# Patient Record
Sex: FEMALE | Race: WHITE | NOT HISPANIC OR LATINO | URBAN - METROPOLITAN AREA
[De-identification: names, ages, dates, MRNs, and addresses within clinical notes are randomized per-mention and may not be internally consistent; named-entity substitution may affect disease eponyms.]

---

## 2017-11-30 ENCOUNTER — OUTPATIENT (OUTPATIENT)
Dept: OUTPATIENT SERVICES | Facility: HOSPITAL | Age: 82
LOS: 1 days | Discharge: HOME | End: 2017-11-30

## 2017-12-01 DIAGNOSIS — Z78.0 ASYMPTOMATIC MENOPAUSAL STATE: ICD-10-CM

## 2017-12-01 DIAGNOSIS — M81.0 AGE-RELATED OSTEOPOROSIS WITHOUT CURRENT PATHOLOGICAL FRACTURE: ICD-10-CM

## 2017-12-01 DIAGNOSIS — Z87.310 PERSONAL HISTORY OF (HEALED) OSTEOPOROSIS FRACTURE: ICD-10-CM

## 2017-12-01 DIAGNOSIS — Z13.820 ENCOUNTER FOR SCREENING FOR OSTEOPOROSIS: ICD-10-CM

## 2020-04-27 PROBLEM — Z00.00 ENCOUNTER FOR PREVENTIVE HEALTH EXAMINATION: Status: ACTIVE | Noted: 2020-04-27

## 2020-11-13 ENCOUNTER — APPOINTMENT (OUTPATIENT)
Dept: CARDIOLOGY | Facility: CLINIC | Age: 85
End: 2020-11-13

## 2021-06-18 ENCOUNTER — APPOINTMENT (OUTPATIENT)
Dept: CARDIOLOGY | Facility: CLINIC | Age: 86
End: 2021-06-18
Payer: MEDICARE

## 2021-06-18 VITALS
WEIGHT: 114 LBS | OXYGEN SATURATION: 98 % | TEMPERATURE: 98 F | RESPIRATION RATE: 16 BRPM | HEIGHT: 60 IN | SYSTOLIC BLOOD PRESSURE: 133 MMHG | BODY MASS INDEX: 22.38 KG/M2 | HEART RATE: 67 BPM | DIASTOLIC BLOOD PRESSURE: 79 MMHG

## 2021-06-18 PROCEDURE — 93000 ELECTROCARDIOGRAM COMPLETE: CPT

## 2021-06-18 PROCEDURE — 99072 ADDL SUPL MATRL&STAF TM PHE: CPT

## 2021-06-18 PROCEDURE — 99204 OFFICE O/P NEW MOD 45 MIN: CPT

## 2021-06-18 RX ORDER — CALCIUM CITRATE/VITAMIN D3 315MG-6.25
TABLET ORAL DAILY
Refills: 0 | Status: ACTIVE | COMMUNITY

## 2021-06-18 NOTE — ASSESSMENT
[FreeTextEntry1] : PAF - Patient has symptomatic paroxysmal atrial fibrillation. CGADVASC 4\par - recommend AC ; on the day and discussed the need for AC. All question answered.\par - event monitor to evaluate burden

## 2021-06-18 NOTE — PHYSICAL EXAM
[Well Developed] : well developed [Well Nourished] : well nourished [No Acute Distress] : no acute distress [Normal Conjunctiva] : normal conjunctiva [Normal Venous Pressure] : normal venous pressure [No Carotid Bruit] : no carotid bruit [No Murmur] : no murmur [Normal S1, S2] : normal S1, S2 [No Rub] : no rub [No Gallop] : no gallop [Clear Lung Fields] : clear lung fields [Good Air Entry] : good air entry [No Respiratory Distress] : no respiratory distress  [Soft] : abdomen soft [Non Tender] : non-tender [No Masses/organomegaly] : no masses/organomegaly [Normal Gait] : normal gait [Normal Bowel Sounds] : normal bowel sounds [No Edema] : no edema [No Cyanosis] : no cyanosis [No Clubbing] : no clubbing [No Varicosities] : no varicosities [No Rash] : no rash [No Skin Lesions] : no skin lesions [Moves all extremities] : moves all extremities [No Focal Deficits] : no focal deficits [Normal Speech] : normal speech [Alert and Oriented] : alert and oriented [Normal memory] : normal memory

## 2021-06-18 NOTE — HISTORY OF PRESENT ILLNESS
[FreeTextEntry1] : Patient with history of HLD, HTN, two episodes of AF after hip surgery CHADVSC 4 (weitgh 52 kg). Patient stated that she has occasional palpitations but not bothering her too much. Patient comes to the office for further evaluation and treatment over atrial fibrillation.\par \par \par EKG SR 60 bpm

## 2021-08-20 ENCOUNTER — APPOINTMENT (OUTPATIENT)
Dept: CARDIOLOGY | Facility: CLINIC | Age: 86
End: 2021-08-20
Payer: MEDICARE

## 2021-08-20 VITALS
HEART RATE: 57 BPM | HEIGHT: 60 IN | BODY MASS INDEX: 22.38 KG/M2 | DIASTOLIC BLOOD PRESSURE: 69 MMHG | TEMPERATURE: 97 F | SYSTOLIC BLOOD PRESSURE: 121 MMHG | WEIGHT: 114 LBS

## 2021-08-20 DIAGNOSIS — I48.91 UNSPECIFIED ATRIAL FIBRILLATION: ICD-10-CM

## 2021-08-20 DIAGNOSIS — I10 ESSENTIAL (PRIMARY) HYPERTENSION: ICD-10-CM

## 2021-08-20 DIAGNOSIS — Z78.9 OTHER SPECIFIED HEALTH STATUS: ICD-10-CM

## 2021-08-20 PROCEDURE — 99213 OFFICE O/P EST LOW 20 MIN: CPT

## 2021-08-20 PROCEDURE — 93000 ELECTROCARDIOGRAM COMPLETE: CPT

## 2021-08-20 RX ORDER — MELATONIN 3 MG
TABLET ORAL DAILY
Refills: 0 | Status: ACTIVE | COMMUNITY

## 2021-08-20 RX ORDER — ATORVASTATIN CALCIUM 10 MG/1
10 TABLET, FILM COATED ORAL DAILY
Refills: 0 | Status: ACTIVE | COMMUNITY

## 2021-08-20 RX ORDER — MIRTAZAPINE 15 MG/1
15 TABLET, FILM COATED ORAL
Refills: 0 | Status: ACTIVE | COMMUNITY

## 2021-08-20 RX ORDER — LEVOTHYROXINE SODIUM 50 UG/1
50 TABLET ORAL DAILY
Refills: 0 | Status: ACTIVE | COMMUNITY

## 2021-08-20 RX ORDER — LISINOPRIL 10 MG/1
10 TABLET ORAL DAILY
Refills: 0 | Status: ACTIVE | COMMUNITY

## 2021-10-17 PROBLEM — I10 BENIGN HYPERTENSION: Status: ACTIVE | Noted: 2021-06-18

## 2021-10-17 PROBLEM — I48.91 ATRIAL FIBRILLATION: Status: ACTIVE | Noted: 2021-10-17

## 2021-10-17 PROBLEM — Z78.9 CURRENT NON-SMOKER: Status: ACTIVE | Noted: 2021-10-17

## 2021-10-17 RX ORDER — METOPROLOL SUCCINATE 50 MG/1
50 TABLET, EXTENDED RELEASE ORAL DAILY
Refills: 0 | Status: ACTIVE | COMMUNITY

## 2021-10-17 RX ORDER — APIXABAN 2.5 MG/1
2.5 TABLET, FILM COATED ORAL TWICE DAILY
Refills: 0 | Status: ACTIVE | COMMUNITY

## 2021-10-17 NOTE — HISTORY OF PRESENT ILLNESS
[FreeTextEntry1] : Patient with history of HLD, HTN, two episodes of AF after hip surgery CHADVSC 4 (weitgh 52 kg). Patient stated that she has occasional palpitations but not bothering her too much. Patient comes to the office for further evaluation and treatment over atrial fibrillation.\par \par \par EKG SR 57 bpm \par event monitor - no AF

## 2021-10-17 NOTE — ASSESSMENT
[FreeTextEntry1] : PAF - Patient has symptomatic paroxysmal atrial fibrillation. CGADVASC 4\par - cont AC ;  All question answered.\par - event monitor no AF\par - discussed possibility of loop implant

## 2022-05-17 ENCOUNTER — APPOINTMENT (OUTPATIENT)
Dept: BURN CARE | Facility: CLINIC | Age: 87
End: 2022-05-17

## 2022-09-12 ENCOUNTER — APPOINTMENT (OUTPATIENT)
Dept: CARDIOLOGY | Facility: CLINIC | Age: 87
End: 2022-09-12

## 2022-09-13 ENCOUNTER — OUTPATIENT (OUTPATIENT)
Dept: OUTPATIENT SERVICES | Facility: HOSPITAL | Age: 87
LOS: 1 days | End: 2022-09-13

## 2022-09-13 ENCOUNTER — TRANSCRIPTION ENCOUNTER (OUTPATIENT)
Age: 87
End: 2022-09-13

## 2022-09-13 VITALS
OXYGEN SATURATION: 93 % | HEART RATE: 88 BPM | WEIGHT: 110.89 LBS | HEIGHT: 60 IN | DIASTOLIC BLOOD PRESSURE: 70 MMHG | RESPIRATION RATE: 18 BRPM | SYSTOLIC BLOOD PRESSURE: 148 MMHG | TEMPERATURE: 99 F

## 2022-09-13 VITALS
TEMPERATURE: 99 F | DIASTOLIC BLOOD PRESSURE: 68 MMHG | HEART RATE: 73 BPM | RESPIRATION RATE: 18 BRPM | SYSTOLIC BLOOD PRESSURE: 138 MMHG | OXYGEN SATURATION: 100 %

## 2022-09-13 DIAGNOSIS — U07.1 COVID-19: ICD-10-CM

## 2022-09-13 RX ORDER — BEBTELOVIMAB 87.5 MG/ML
175 INJECTION, SOLUTION INTRAVENOUS ONCE
Refills: 0 | Status: COMPLETED | OUTPATIENT
Start: 2022-09-13 | End: 2022-09-13

## 2022-09-13 RX ADMIN — BEBTELOVIMAB 175 MILLIGRAM(S): 87.5 INJECTION, SOLUTION INTRAVENOUS at 15:00

## 2022-09-13 NOTE — CHART NOTE - NSCHARTNOTEFT_GEN_A_CORE
CC: Monoclonal Antibody Infusion/COVID 19 Positive on 9/13/22  87y Female with recent dx of COVID 19+ who presents today for elective Bebtelovimab. Patient has been screened and was deemed to be a candidate.    Symptoms/ Criteria  Date of Symptom Onset: 9/13/22  Symptoms: fever, cough congestion body aches  Date of Positive COVID PCR: 9/13/22  Risk Profile includes:   Age > 65 HLD Hypothyroid    Vaccination Status: Moderna x4    PMHx:  Infection due to severe acute respiratory syndrome coronavirus 2 (SARS-CoV-2)    SysAdmin_VisitLink        Exam/findings:  T(C): 37.4 (09-13-22 @ 14:34), Max: 37.4 (09-13-22 @ 14:34)  HR: 88 (09-13-22 @ 14:34) (88 - 88)  BP: 148/70 (09-13-22 @ 14:34) (148/70 - 148/70)  RR: 18 (09-13-22 @ 14:34) (18 - 18)  SpO2: 93% (09-13-22 @ 14:34) (93% - 93%)    PE:   Appearance: NAD	  HEENT:  NC/AT  Cardiovascular:  No edema  Respiratory: no use of accessory muscles  Gastrointestinal:  non-distended   Skin: warm and dry  Neurologic: Non-focal  Extremities: Normal range of motion    ASSESSMENT:  Pt is COVID positive with mild to moderate symptoms who was referred for elective MAB (Bebtelovimab).    PLAN:  - MAB treatment explained to patient. I have reviewed the Bebtelovimab Emergency Use Authorization (EUA) and I have provided the patient or patient's caregiver with the following information:   1. FDA has authorized emergency use of Bebtelovimab to be administered for the treatment of mild to moderate COVID-19, which is not an FDA-approved biological product.   2. The patient or patient's caregiver has the option to accept or refuse administration of MAB.   3. The significant known and potential risks and benefits of Bebtelovimab and the extent to which such risks and benefits are unknown.  4. Information on available alternative treatments and risks and benefits of those alternatives.  - Patient verbalized understanding of plan and agrees to treatment. All questions answered.  - Consent for MAB obtained.   - 175mg of Bebtelovimab administered as a single intravenous injection over at least 30 seconds.   - Observe patient for one hour post medication administration and then if stable, discharge home with oupatient follow up as planned by PCP.      POST ASSESSMENT:   Patient completed MAB, and monitored x 1 hour post-infusion with no adverse reactions noted, remained hemodynamically stable.  - Patient tolerated infusion well; denies complaints of chest pain/SOB/dizziness/palpitations.   - VSS for discharge home.  - D/C instructions given/ fact sheet included.  - Patient was instructed to self-isolate and use infection control measures (e.g wear mask, isolate, social distance, avoid sharing personal items, clean and disinfect "high touch" surfaces, and frequent handwashing according to the CDC guidelines.   - The patient was informed on what symptoms to be aware of for the next couple of days, and if there are any issues to call the 24/7 clinical call center. Patient was instructed to follow up with primary care provider as needed.  -Discharge 1 hour post-injection

## 2022-09-14 ENCOUNTER — TRANSCRIPTION ENCOUNTER (OUTPATIENT)
Age: 87
End: 2022-09-14

## 2022-09-15 ENCOUNTER — APPOINTMENT (OUTPATIENT)
Age: 87
End: 2022-09-15

## 2022-09-16 ENCOUNTER — TRANSCRIPTION ENCOUNTER (OUTPATIENT)
Age: 87
End: 2022-09-16

## 2022-09-27 ENCOUNTER — OUTPATIENT (OUTPATIENT)
Dept: OUTPATIENT SERVICES | Facility: HOSPITAL | Age: 87
LOS: 1 days | Discharge: HOME | End: 2022-09-27

## 2022-09-27 ENCOUNTER — APPOINTMENT (OUTPATIENT)
Dept: BURN CARE | Facility: CLINIC | Age: 87
End: 2022-09-27

## 2022-09-27 DIAGNOSIS — S81.802A UNSPECIFIED OPEN WOUND, LEFT LOWER LEG, INITIAL ENCOUNTER: ICD-10-CM

## 2022-09-27 PROCEDURE — 97597 DBRDMT OPN WND 1ST 20 CM/<: CPT

## 2022-09-27 RX ORDER — CEPHALEXIN 250 MG/1
250 TABLET ORAL EVERY 6 HOURS
Qty: 20 | Refills: 0 | Status: ACTIVE | COMMUNITY
Start: 2022-09-27 | End: 1900-01-01

## 2022-09-29 LAB — BACTERIA SPEC CULT: ABNORMAL

## 2022-09-30 DIAGNOSIS — S81.802A UNSPECIFIED OPEN WOUND, LEFT LOWER LEG, INITIAL ENCOUNTER: ICD-10-CM
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
## 2022-10-06 ENCOUNTER — APPOINTMENT (OUTPATIENT)
Dept: BURN CARE | Facility: CLINIC | Age: 87
End: 2022-10-06

## 2023-06-22 ENCOUNTER — EMERGENCY VISIT (OUTPATIENT)
Dept: URBAN - METROPOLITAN AREA CLINIC 32 | Facility: CLINIC | Age: 88
End: 2023-06-22

## 2023-06-22 DIAGNOSIS — H16.223: ICD-10-CM

## 2023-06-22 DIAGNOSIS — H02.88B: ICD-10-CM

## 2023-06-22 DIAGNOSIS — H35.3132: ICD-10-CM

## 2023-06-22 DIAGNOSIS — Z96.1: ICD-10-CM

## 2023-06-22 DIAGNOSIS — H01.02B: ICD-10-CM

## 2023-06-22 DIAGNOSIS — H02.88A: ICD-10-CM

## 2023-06-22 DIAGNOSIS — H01.02A: ICD-10-CM

## 2023-06-22 PROCEDURE — 99204 OFFICE O/P NEW MOD 45 MIN: CPT

## 2023-06-22 PROCEDURE — 92250 FUNDUS PHOTOGRAPHY W/I&R: CPT

## 2023-06-22 ASSESSMENT — VISUAL ACUITY
OS_CC: 20/40-2
OD_CC: J1+
OD_SC: 20/25-1
OD_CC: 20/25-2
OS_SC: 20/50-2
OS_CC: J1

## 2023-06-22 ASSESSMENT — TONOMETRY
OD_IOP_MMHG: 14
OS_IOP_MMHG: 14

## 2023-09-06 ENCOUNTER — APPOINTMENT (OUTPATIENT)
Dept: PAIN MANAGEMENT | Facility: CLINIC | Age: 88
End: 2023-09-06
Payer: MEDICARE

## 2023-09-06 DIAGNOSIS — F41.9 ANXIETY DISORDER, UNSPECIFIED: ICD-10-CM

## 2023-09-06 DIAGNOSIS — F32.A ANXIETY DISORDER, UNSPECIFIED: ICD-10-CM

## 2023-09-06 DIAGNOSIS — Z86.79 PERSONAL HISTORY OF OTHER DISEASES OF THE CIRCULATORY SYSTEM: ICD-10-CM

## 2023-09-06 PROCEDURE — 99204 OFFICE O/P NEW MOD 45 MIN: CPT

## 2023-09-07 NOTE — HISTORY OF PRESENT ILLNESS
[FreeTextEntry1] : HPI: Pt is present for low back pain and b/l hip pain. Pt fell in April and worsened approximately a month ago.Pt utilized the heating pad to manage pain. Pain is located in the bilateral low back above the waistband. The pain is mainly located in the low back on both sides along the paraspinal muscles and the top of the buttock bilaterally. The pain is aching and throbbing in the back with occasional sharp stabbing sensations that can be very sudden and debilitating. The pain is made worse with standing from a seated position and with rotation to the left and right side. Pain is associated with stiffness. The patients functional status and quality of life has been significantly impacted negatively from the pain and has not responded to conservative measures such as rest, heat, otc pain medications, and activity modifications. Pain is 8/10 at worst. Patient denies any fevers, chills, weight loss, bowel or bladder dysfunction, incontinence, or saddle anesthesia.

## 2023-09-07 NOTE — PHYSICAL EXAM
[de-identified] : Lumbar Spine Exam: Inspection: erythema (-) ecchymosis (-) rashes (-) alignment: no scoliosis  Palpation: Midline lumbar tenderness:             (-) paraspinal tenderness:                  L (+) ; R (+) sciatic nerve tenderness :             L (-) ; R (-) PSIS tenderness:                           L (+); R (+) SI joint tenderness:                        L (-) ; R (-) GTB tenderness:                            L (-);  R (-)  Limited ROM 2/2 to pain with lumbar extension and rotation bilaterally Stiffness noted on exam during extension and rotation to both R and L side  Strength: 5/5 throughout all muscle groups of the lower extremity                                     Right       Left    Hip Flexion:                (5/5)       (5/5) Quadriceps:               (5/5)       (5/5) Hamstrings:                (5/5)       (5/5) Ankle Dorsiflexion:     (5/5)       (5/5) EHL:                           (5/5)       (5/5) Ankle Plantarflexion:  (5/5)       (5/5)  Special Tests: SLR:                           R (-) ; L (-) Facet loading:            R (+) ; L (+) KEMPS test:               R (+);  L (+) BENJAMIN test:               R (-) ; L (-)  Neurologic: Light touch intact throughout LE  Reflexes normal and symmetric   Gait: non- antalgic gait, cautious / slow gait especially after initially standing up

## 2023-09-07 NOTE — DATA REVIEWED
[Report was reviewed and noted in the chart] : The report was reviewed and noted in the chart [I independently reviewed and interpreted images and report] : I independently reviewed and interpreted images and report [I reviewed the films/CD and agree] : I reviewed the films/CD and agree [FreeTextEntry1] : 9/2/22 MRI LUMBAR SPINE Multisegmental disc degeneration. L4-5 severe left and moderate right foraminal stenosis causes compress of L4 nerve roots, left greater than right. L5-S1 grade 1 degenerative anterolistheis .   8/26/23 Superior endplate deformity of L1, age indeterminant, though new as compared to 9/14/2015. Further workup/follow-up is advised as clinically directed. 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  8/26/23 XRAY THORACIC  Mild mid thoracic dextroscoliosis with associated degenerative changes.

## 2023-09-07 NOTE — DISCUSSION/SUMMARY
[de-identified] : A discussion regarding available pain management treatment options occurred with the patient.  These included interventional, rehabilitative, pharmacological, and alternative modalities. We will proceed with the followin: c/w tramadol 50 mg   2: Pt advised to utilize Tylenol arthritis 650mg to manage pain   3: Discussed injection therapy for the low back   4; MRI lumbar spine w/o contrast. She recently fell and has new pain. It was ordered to evaluate for anatomic changes of the lumbar discs, nerves, and surrounding tissue that will help provide information to accurately diagnose the patient's cause of pain and therefore treat said pain generator in the most effective way possible - whether that be specific physical therapy recommendations, medications, and/or interventional therapies.  5: f/u in 3 weeks for an image review   I Arlet Robles attest that this documentation has been prepared under the direction and in the presence of provider Dr. Ravinder Ventura.  The documentation recorded by the scribe in my presence, accurately reflects the service I personally performed, and the decisions made by me with my edits as appropriate.   Ravinder Ventura, DO

## 2023-09-19 ENCOUNTER — APPOINTMENT (OUTPATIENT)
Dept: PAIN MANAGEMENT | Facility: CLINIC | Age: 88
End: 2023-09-19
Payer: MEDICARE

## 2023-09-19 VITALS — HEIGHT: 60 IN | WEIGHT: 114 LBS | BODY MASS INDEX: 22.38 KG/M2

## 2023-09-19 DIAGNOSIS — M54.16 RADICULOPATHY, LUMBAR REGION: ICD-10-CM

## 2023-09-19 DIAGNOSIS — S32.000A WEDGE COMPRESSION FRACTURE OF UNSPECIFIED LUMBAR VERTEBRA, INITIAL ENCOUNTER FOR CLOSED FRACTURE: ICD-10-CM

## 2023-09-19 DIAGNOSIS — M47.816 SPONDYLOSIS W/OUT MYELOPATHY OR RADICULOPATHY, LUMBAR REGION: ICD-10-CM

## 2023-09-19 PROCEDURE — 99214 OFFICE O/P EST MOD 30 MIN: CPT

## 2023-09-22 PROBLEM — M54.16 LUMBAR RADICULOPATHY, CHRONIC: Status: ACTIVE | Noted: 2023-09-22

## 2023-10-30 ENCOUNTER — APPOINTMENT (OUTPATIENT)
Dept: PAIN MANAGEMENT | Facility: CLINIC | Age: 88
End: 2023-10-30

## 2023-10-31 ENCOUNTER — APPOINTMENT (OUTPATIENT)
Dept: CARDIOLOGY | Facility: CLINIC | Age: 88
End: 2023-10-31

## 2024-08-27 ENCOUNTER — TRANSCRIPTION ENCOUNTER (OUTPATIENT)
Age: 89
End: 2024-08-27

## 2024-08-30 ENCOUNTER — APPOINTMENT (OUTPATIENT)
Dept: PAIN MANAGEMENT | Facility: CLINIC | Age: 89
End: 2024-08-30

## 2024-08-30 DIAGNOSIS — M47.816 SPONDYLOSIS W/OUT MYELOPATHY OR RADICULOPATHY, LUMBAR REGION: ICD-10-CM

## 2024-08-30 DIAGNOSIS — M54.6 PAIN IN THORACIC SPINE: ICD-10-CM

## 2024-08-30 PROCEDURE — G2211 COMPLEX E/M VISIT ADD ON: CPT

## 2024-08-30 PROCEDURE — 99213 OFFICE O/P EST LOW 20 MIN: CPT

## 2024-09-03 NOTE — HISTORY OF PRESENT ILLNESS
[FreeTextEntry1] : HPI: Pt is present for low back pain and b/l hip pain. Pt fell in April and worsened approximately a month ago. Pt utilized the heating pad to manage pain. Pain is located in the bilateral low back above the waistband. The pain is mainly located in the low back on both sides along the paraspinal muscles and the top of the buttock bilaterally. The pain is aching and throbbing in the back with occasional sharp stabbing sensations that can be very sudden and debilitating. The pain is made worse with standing from a seated position and with rotation to the left and right side. Pain is associated with stiffness. The patient's functional status and quality of life has been significantly impacted negatively from the pain and has not responded to conservative measures such as rest, heat, otc pain medications, and activity modifications. Pain is 8/10 at worst. Patient denies any fevers, chills, weight loss, bowel or bladder dysfunction, incontinence, or saddle anesthesia.  9/19/23: Today this patient is here for an image review. We reviewed the MRI of the lumbar spine in detail with the patient. To reiterate the patients low back pain started two months ago. The patient states she has bad and good days with minimal pain. The pain is axial across the low back and states the pain can refer to the left buttock and wrap around. Pain is worse upon standing from a seated position and alleviates once start walking. The pain is worse in the morning that is associated with stiffness. The patients low back pain is an 8/10 on the pain scale today. To manage this pain, she is taking tramadol and has recently switched to Tylenol arthritis.    Patient denies any bowel or bladder dysfunction, incontinence, or saddle anesthesia.  8-: Revisit encounter. She is accompanied by her daughter today.   She is presenting with a flare up of lower back pain. Over the last couple of weeks, she has been feeling her pain worsening. Pain is in the back and refers into the buttock and also upwards into the thoracic region. Pain is made worse with transitioning from a seated to standing position. Her pain does alleviate when she starts to walk. The most severe of her pain is when she wakes up first thing in the morning. She can also not sit for prolonged periods of time due to the pain. Pain is present daily. She continues to use a cane to ambulate.   Of note, she recently fell and fracture her right wrist.

## 2024-09-03 NOTE — DATA REVIEWED
[Report was reviewed and noted in the chart] : The report was reviewed and noted in the chart [I independently reviewed and interpreted images and report] : I independently reviewed and interpreted images and report [I reviewed the films/CD and agree] : I reviewed the films/CD and agree [FreeTextEntry1] : 9/2/22 MRI LUMBAR SPINE Multisegmental disc degeneration. L4-5 severe left and moderate right foraminal stenosis causes compress of L4 nerve roots, left greater than right. L5-S1 grade 1 degenerative anterolistheis .   8/26/23 Superior endplate deformity of L1, age indeterminant, though new as compared to 9/14/2015. Further workup/follow-up is advised as clinically directed. 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  8/26/23 XRAY THORACIC  Mild mid thoracic dextroscoliosis with associated degenerative changes.   MRI of the lumbar spine done on 9/13/23: Mild compression fracture at L1 with an acute to subacute component of injury. Diffuse disc bulge at L1-2 with moderate right foraminal compromise, progressive.  Diffuse disc bulge at L2-3 with moderate right and mild left foraminal compromise, stable  Mild to moderate spinal stenosis at L3-4, mildly progressive. Moderate BL foraminal compromise, stable.  Diffuse disc bulge at L4 marked left and mild right foraminal compromise, stable.  Mild broad-based central disc protrusion at L5-S1 with mild disc bulge and mild LB foraminal compromise. stable.

## 2024-09-03 NOTE — DISCUSSION/SUMMARY
[de-identified] : A discussion regarding available pain management treatment options occurred with the patient.  These included interventional, rehabilitative, pharmacological, and alternative modalities. We will proceed with the following:  Interventional treatment options: 1. Proceed with a Bilateral L3, L4, L5 medial branch facet block under fluoroscopic guidance.  Treatment options were discussed with the patient. The patient has been having persistent axial low back pain that has minimally improved with conservative therapy.    The patient was given the option to proceed with a lumbar medial branch block, which would be potentially both diagnostic and therapeutic. If the patient has a positive response to the local anesthetic, With two positive responses we can proceed with a radiofrequency ablation of the lumbar medial branch nerves for potential long term pain relief. If the patient does not get relief we can consider other options.    The risks and benefits were discussed which included bleeding, infection, nerve injury, no pain relief or worse, increased pain, intrathecal injection, and the side effects of steroids.  All questions were answered to the patient's satisfaction.  Imaging: - Patient has worsening lower back pain. She has a history of compression fractures and underwent a kyphoplasty in February in 2024.  1. MRI lumbar/thoracic spine w/o contrast to evaluate for anatomic changes of the lumbar/thoracic discs, nerves, and surrounding tissue that will help provide information to accurately diagnose the patient's cause of pain and therefore treat said pain generator in the most effective way possible - whether that be specific physical therapy recommendations, medications, and/or interventional therapies.   - Follow up 2 weeks post injection.   I Oliva Sanchez attest that this documentation has been prepared under the direction and in the presence of provider Dr. Ravinder Venutra.  The documentation recorded by the scribe in my presence, accurately reflects the service I personally performed, and the decisions made by me with my edits as appropriate.   Ravinder Ventura, DO 
[de-identified] : A discussion regarding available pain management treatment options occurred with the patient.  These included interventional, rehabilitative, pharmacological, and alternative modalities. We will proceed with the following:  Interventional treatment options: 1. Proceed with a Bilateral L3, L4, L5 medial branch facet block under fluoroscopic guidance.  Treatment options were discussed with the patient. The patient has been having persistent axial low back pain that has minimally improved with conservative therapy.    The patient was given the option to proceed with a lumbar medial branch block, which would be potentially both diagnostic and therapeutic. If the patient has a positive response to the local anesthetic, With two positive responses we can proceed with a radiofrequency ablation of the lumbar medial branch nerves for potential long term pain relief. If the patient does not get relief we can consider other options.    The risks and benefits were discussed which included bleeding, infection, nerve injury, no pain relief or worse, increased pain, intrathecal injection, and the side effects of steroids.  All questions were answered to the patient's satisfaction.  Imaging: - Patient has worsening lower back pain. She has a history of compression fractures and underwent a kyphoplasty in February in 2024.  1. MRI lumbar/thoracic spine w/o contrast to evaluate for anatomic changes of the lumbar/thoracic discs, nerves, and surrounding tissue that will help provide information to accurately diagnose the patient's cause of pain and therefore treat said pain generator in the most effective way possible - whether that be specific physical therapy recommendations, medications, and/or interventional therapies.   - Follow up 2 weeks post injection.   I Oliva Sanchez attest that this documentation has been prepared under the direction and in the presence of provider Dr. Ravinder Ventura.  The documentation recorded by the scribe in my presence, accurately reflects the service I personally performed, and the decisions made by me with my edits as appropriate.   Ravinder Ventura, DO 
III TWI, No acute ST elevations or depressions

## 2024-09-03 NOTE — PHYSICAL EXAM
[de-identified] : Lumbar Spine Exam: Inspection: erythema (-) ecchymosis (-) rashes (-) alignment: no scoliosis  Palpation: Midline lumbar tenderness:             (-) paraspinal tenderness:                  L (+) ; R (+) sciatic nerve tenderness :             L (-) ; R (-) PSIS tenderness:                           L (+); R (+) SI joint tenderness:                        L (-) ; R (-) GTB tenderness:                            L (-);  R (-)  Limited ROM 2/2 to pain with lumbar extension and rotation bilaterally Stiffness noted on exam during extension and rotation to both R and L side  Strength: 5/5 throughout all muscle groups of the lower extremity                                     Right       Left    Hip Flexion:                (5/5)       (5/5) Quadriceps:               (5/5)       (5/5) Hamstrings:                (5/5)       (5/5) Ankle Dorsiflexion:     (5/5)       (5/5) EHL:                           (5/5)       (5/5) Ankle Plantarflexion:  (5/5)       (5/5)  Special Tests: SLR:                           R (-) ; L (-) Facet loading:            R (+) ; L (+) KEMPS test:               R (+);  L (+) BENJAMIN test:               R (-) ; L (-)  Neurologic: Light touch intact throughout LE  Reflexes normal and symmetric   Gait: non- antalgic gait, cautious / slow gait especially after initially standing up   Cervical Spine Exam:  Inspection: erythema (-)  ecchymosis (-)  rashes (-)   Palpation:                                         Cervical paraspinal mm tenderness:   R (-); L(-) Upper trapezius mm tenderness:        R (-); L (-) Rhomboids tenderness:                       R (-); L (-) Scalenes mm tenderness:                   R (-); L (-) Occipital Ridge:                                    R (-); L (-) Supraspinatus mm tenderness:           R (-); L (-)  ROM:   limited rom 2/2 to pain  Strength Testing:            Right    Left Deltoid                             (5/5)    (5/5) Biceps:                            (5/5)    (5/5) Triceps:                           (5/5)    (5/5) Finger Abductors:           (5/5)    (5/5) Grasp:                             (5/5)    (5/5)  Special Testing: Spurling Test:                  R (-); L (-) Facet load test:               R (-); L (-)

## 2024-09-03 NOTE — PHYSICAL EXAM
[de-identified] : Lumbar Spine Exam: Inspection: erythema (-) ecchymosis (-) rashes (-) alignment: no scoliosis  Palpation: Midline lumbar tenderness:             (-) paraspinal tenderness:                  L (+) ; R (+) sciatic nerve tenderness :             L (-) ; R (-) PSIS tenderness:                           L (+); R (+) SI joint tenderness:                        L (-) ; R (-) GTB tenderness:                            L (-);  R (-)  Limited ROM 2/2 to pain with lumbar extension and rotation bilaterally Stiffness noted on exam during extension and rotation to both R and L side  Strength: 5/5 throughout all muscle groups of the lower extremity                                     Right       Left    Hip Flexion:                (5/5)       (5/5) Quadriceps:               (5/5)       (5/5) Hamstrings:                (5/5)       (5/5) Ankle Dorsiflexion:     (5/5)       (5/5) EHL:                           (5/5)       (5/5) Ankle Plantarflexion:  (5/5)       (5/5)  Special Tests: SLR:                           R (-) ; L (-) Facet loading:            R (+) ; L (+) KEMPS test:               R (+);  L (+) BENJAMIN test:               R (-) ; L (-)  Neurologic: Light touch intact throughout LE  Reflexes normal and symmetric   Gait: non- antalgic gait, cautious / slow gait especially after initially standing up   Cervical Spine Exam:  Inspection: erythema (-)  ecchymosis (-)  rashes (-)   Palpation:                                         Cervical paraspinal mm tenderness:   R (-); L(-) Upper trapezius mm tenderness:        R (-); L (-) Rhomboids tenderness:                       R (-); L (-) Scalenes mm tenderness:                   R (-); L (-) Occipital Ridge:                                    R (-); L (-) Supraspinatus mm tenderness:           R (-); L (-)  ROM:   limited rom 2/2 to pain  Strength Testing:            Right    Left Deltoid                             (5/5)    (5/5) Biceps:                            (5/5)    (5/5) Triceps:                           (5/5)    (5/5) Finger Abductors:           (5/5)    (5/5) Grasp:                             (5/5)    (5/5)  Special Testing: Spurling Test:                  R (-); L (-) Facet load test:               R (-); L (-)

## 2024-09-16 ENCOUNTER — APPOINTMENT (OUTPATIENT)
Dept: PAIN MANAGEMENT | Facility: CLINIC | Age: 89
End: 2024-09-16
Payer: MEDICARE

## 2024-09-16 DIAGNOSIS — M47.816 SPONDYLOSIS W/OUT MYELOPATHY OR RADICULOPATHY, LUMBAR REGION: ICD-10-CM

## 2024-09-16 PROCEDURE — 64493 INJ PARAVERT F JNT L/S 1 LEV: CPT | Mod: 50

## 2024-09-16 PROCEDURE — 64494 INJ PARAVERT F JNT L/S 2 LEV: CPT | Mod: 50

## 2024-09-17 NOTE — PROCEDURE
[FreeTextEntry3] : Date of Service: 09/16/2024   Account: [id number]  Patient: TEE KENDRICK   YOB: 1935  Age: 89 year  Surgeon:                  Ravinder Ventura DO  Assistant:                None  Pre-Operative Diagnosis:   Lumbar Spondylosis      Post Operative Diagnosis:  Lumbar Spondylosis  Procedure:             Bilateral  (L3, L4, L5 medial branch) L4-5, L5-S1 facet joint block under fluoroscopic guidance.  Anesthesia:            none  This procedure was carried out using fluoroscopic guidance.  The risks and benefits of the procedure were discussed extensively with the patient.  The consent of the patient was obtained and the following procedure was performed. The patient was placed in the prone position on the fluoroscopy table and the area was prepped and draped in a sterile fashion.  A timeout was performed with all essential staff present and the site and side were verified.  The lumbar vertebral bodies were identified and the fluoroscope was obliqued ipsilateral to approximately 30 degrees to reveal the appropriate anatomical view.  The junction of the superior articulate process and transverse process at the right L4, and L5 levels were identified and marked. A spinal needle was then introduced and advanced to the above target points at the junction of the SAP and transverse processes until bone was contacted.  Fluoroscope then focused on the right sacral ala on A/P view, and marked at this point.  A spinal needle was then advanced under fluoroscopic guidance until bone was contacted at the ala.    After negative aspiration for heme and CSF, an 1 cc of a mixture of 0.5% Marcaine and 10mg decadron was injected at each of the injection sites.  The procedure was performed in the exact same fashion on the contralateral left side at the L4, L5 and sacral ala levels.  The needles were subsequently removed.  Vital signs remained normal.  Pulse oximeter was used throughout the procedure and the patient's pulse and oxygen saturation remained within normal limits.  The patient tolerated the procedure well.  There were no complications.  The patient was instructed to apply ice over the injection sites for twenty minutes every two hours for the next 24 to 48 hours.  Disposition:       1. The patient was advised to F/U in 1-2 weeks to assess the response to the injection.       2. They were advised to keep a pain diary to report the results of the diagnostic block at their FUV.      3. The patient was also instructed to contact me immediately if there were any concerns related to the procedure performed.

## 2024-10-01 ENCOUNTER — APPOINTMENT (OUTPATIENT)
Dept: PAIN MANAGEMENT | Facility: CLINIC | Age: 89
End: 2024-10-01
Payer: MEDICARE

## 2024-10-01 VITALS — WEIGHT: 114 LBS | HEIGHT: 60 IN | BODY MASS INDEX: 22.38 KG/M2

## 2024-10-01 DIAGNOSIS — M47.816 SPONDYLOSIS W/OUT MYELOPATHY OR RADICULOPATHY, LUMBAR REGION: ICD-10-CM

## 2024-10-01 DIAGNOSIS — M54.6 PAIN IN THORACIC SPINE: ICD-10-CM

## 2024-10-01 PROCEDURE — 99213 OFFICE O/P EST LOW 20 MIN: CPT

## 2024-10-04 NOTE — PHYSICAL EXAM
[de-identified] : L spine   No rashes, erythema, edema noted TTP b/l lumbar paraspinal muscles Positive facet loading bilaterally Positive KEMPS test bilaterally LLE: 5/5 strength RLE: 5/5 strength Sensation intact b/l LE

## 2024-10-04 NOTE — PHYSICAL EXAM
[de-identified] : L spine   No rashes, erythema, edema noted TTP b/l lumbar paraspinal muscles Positive facet loading bilaterally Positive KEMPS test bilaterally LLE: 5/5 strength RLE: 5/5 strength Sensation intact b/l LE

## 2024-10-04 NOTE — HISTORY OF PRESENT ILLNESS
[FreeTextEntry1] : HPI: Pt is present for low back pain and b/l hip pain. Pt fell in April and worsened approximately a month ago. Pt utilized the heating pad to manage pain. Pain is located in the bilateral low back above the waistband. The pain is mainly located in the low back on both sides along the paraspinal muscles and the top of the buttock bilaterally. The pain is aching and throbbing in the back with occasional sharp stabbing sensations that can be very sudden and debilitating. The pain is made worse with standing from a seated position and with rotation to the left and right side. Pain is associated with stiffness. The patient's functional status and quality of life has been significantly impacted negatively from the pain and has not responded to conservative measures such as rest, heat, otc pain medications, and activity modifications. Pain is 8/10 at worst. Patient denies any fevers, chills, weight loss, bowel or bladder dysfunction, incontinence, or saddle anesthesia.  9/19/23: Today this patient is here for an image review. We reviewed the MRI of the lumbar spine in detail with the patient. To reiterate the patients low back pain started two months ago. The patient states she has bad and good days with minimal pain. The pain is axial across the low back and states the pain can refer to the left buttock and wrap around. Pain is worse upon standing from a seated position and alleviates once start walking. The pain is worse in the morning that is associated with stiffness. The patients low back pain is an 8/10 on the pain scale today. To manage this pain, she is taking tramadol and has recently switched to Tylenol arthritis.    Patient denies any bowel or bladder dysfunction, incontinence, or saddle anesthesia.  8-: Revisit encounter. She is accompanied by her daughter today.   She is presenting with a flare up of lower back pain. Over the last couple of weeks, she has been feeling her pain worsening. Pain is in the back and refers into the buttock and also upwards into the thoracic region. Pain is made worse with transitioning from a seated to standing position. Her pain does alleviate when she starts to walk. The most severe of her pain is when she wakes up first thing in the morning. She can also not sit for prolonged periods of time due to the pain. Pain is present daily. She continues to use a cane to ambulate.   Of note, she recently fell and fracture her right wrist.   10-1-2024: Revisit encounter. She is accompanied by her daughter today.   Since her last office visit, she underwent a bilateral L3, L4, L5 medial branch facet block on 9-. She afforded about 90% sustained relief from this procedure for about 3 days. Her pain then slowly started to return to baseline. Today, she is presenting with ongoing pain in the lower back. Pain is axial in nature. Her pain is associated with stiffness and spasms. Her pain is the most severe when transitioning from a seated to standing position or when walking/standing for prolonged periods of time. Pain is present daily. She uses a cane to ambulate. She has been managing her pain with Tramadol 50 mg prn for severe pain.

## 2024-10-04 NOTE — DISCUSSION/SUMMARY
[de-identified] : A discussion regarding available pain management treatment options occurred with the patient.  These included interventional, rehabilitative, pharmacological, and alternative modalities. We will proceed with the following:  Interventional treatment options: The patient is having lumbar back pain with radicular symptoms down the LLE. L5-S1 LESI w/o MAC Treatment options were discussed. The patient has been having persistent, severe low back pain and lumbar radicular pain that has minimally improved with conservative therapy thus far (including physical therapy, home stretching and anti-inflammatory medications. The patient was given the option of proceeding with a lumbar epidural steroid injection to try and get the patient some pain relief. The risks and benefits were discussed, which included the associated problems with steroids, bleeding, nerve injury, lack of improvement with pain, and 0.5% chance of a post dural puncture headache.   Medications: 1. Ordered Tramadol 50 mg qD prn for severe pain.   ISTOP Checked Reference # [ [id number] ]  The R/B/A of chronic opiate therapy for non-malignant pain including, but not limited to, the risk of addiction, overdose, respiratory depression, and death was discussed and accepted by the patient. Patient was also informed that they should not consume alcohol or drive a motor vehicle under any circumstances while taking opiate pain medications.  The patient reports good pain control with their current medication regimen. They deny any intolerable side effects. There is no obvious aberrant behavior. The patient is more functional with regard to his ADLs and social activity as result of their current medication regimen.  The patient has reviewed and signed an opioid agreement delineating the use of opioid pain medications within our practice. All questions answered to patient's satisfaction.  UDS: Ordered today. She was given a script to bring to a contracted [facility].   - Follow up 2 weeks post injection. Will consider an epidural injection after her radiofrequency ablation.   I Oliva Sanchez attest that this documentation has been prepared under the direction and in the presence of provider Dr. Ravinder Ventura.  The documentation recorded by the scribe in my presence, accurately reflects the service I personally performed, and the decisions made by me with my edits as appropriate.   Raivnder Ventura, DO

## 2024-10-04 NOTE — DISCUSSION/SUMMARY
[de-identified] : A discussion regarding available pain management treatment options occurred with the patient.  These included interventional, rehabilitative, pharmacological, and alternative modalities. We will proceed with the following:  Interventional treatment options: The patient is having lumbar back pain with radicular symptoms down the LLE. L5-S1 LESI w/o MAC Treatment options were discussed. The patient has been having persistent, severe low back pain and lumbar radicular pain that has minimally improved with conservative therapy thus far (including physical therapy, home stretching and anti-inflammatory medications. The patient was given the option of proceeding with a lumbar epidural steroid injection to try and get the patient some pain relief. The risks and benefits were discussed, which included the associated problems with steroids, bleeding, nerve injury, lack of improvement with pain, and 0.5% chance of a post dural puncture headache.   Medications: 1. Ordered Tramadol 50 mg qD prn for severe pain.   ISTOP Checked Reference # [ [id number] ]  The R/B/A of chronic opiate therapy for non-malignant pain including, but not limited to, the risk of addiction, overdose, respiratory depression, and death was discussed and accepted by the patient. Patient was also informed that they should not consume alcohol or drive a motor vehicle under any circumstances while taking opiate pain medications.  The patient reports good pain control with their current medication regimen. They deny any intolerable side effects. There is no obvious aberrant behavior. The patient is more functional with regard to his ADLs and social activity as result of their current medication regimen.  The patient has reviewed and signed an opioid agreement delineating the use of opioid pain medications within our practice. All questions answered to patient's satisfaction.  UDS: Ordered today. She was given a script to bring to a contracted [facility].   - Follow up 2 weeks post injection. Will consider an epidural injection after her radiofrequency ablation.   I Oliva Sanchez attest that this documentation has been prepared under the direction and in the presence of provider Dr. Ravinder Ventura.  The documentation recorded by the scribe in my presence, accurately reflects the service I personally performed, and the decisions made by me with my edits as appropriate.   Ravinder Ventura, DO

## 2024-10-04 NOTE — DISCUSSION/SUMMARY
[de-identified] : A discussion regarding available pain management treatment options occurred with the patient.  These included interventional, rehabilitative, pharmacological, and alternative modalities. We will proceed with the following:  Interventional treatment options: The patient is having lumbar back pain with radicular symptoms down the LLE. L5-S1 LESI w/o MAC Treatment options were discussed. The patient has been having persistent, severe low back pain and lumbar radicular pain that has minimally improved with conservative therapy thus far (including physical therapy, home stretching and anti-inflammatory medications. The patient was given the option of proceeding with a lumbar epidural steroid injection to try and get the patient some pain relief. The risks and benefits were discussed, which included the associated problems with steroids, bleeding, nerve injury, lack of improvement with pain, and 0.5% chance of a post dural puncture headache.   Medications: 1. Ordered Tramadol 50 mg qD prn for severe pain.   ISTOP Checked Reference # [ [id number] ]  The R/B/A of chronic opiate therapy for non-malignant pain including, but not limited to, the risk of addiction, overdose, respiratory depression, and death was discussed and accepted by the patient. Patient was also informed that they should not consume alcohol or drive a motor vehicle under any circumstances while taking opiate pain medications.  The patient reports good pain control with their current medication regimen. They deny any intolerable side effects. There is no obvious aberrant behavior. The patient is more functional with regard to his ADLs and social activity as result of their current medication regimen.  The patient has reviewed and signed an opioid agreement delineating the use of opioid pain medications within our practice. All questions answered to patient's satisfaction.  UDS: Ordered today. She was given a script to bring to a contracted [facility].   - Follow up 2 weeks post injection. Will consider an epidural injection after her radiofrequency ablation.   I Oliva Sanchez attest that this documentation has been prepared under the direction and in the presence of provider Dr. Ravinder Ventura.  The documentation recorded by the scribe in my presence, accurately reflects the service I personally performed, and the decisions made by me with my edits as appropriate.   Ravinder Ventura, DO

## 2024-10-04 NOTE — PHYSICAL EXAM
[de-identified] : L spine   No rashes, erythema, edema noted TTP b/l lumbar paraspinal muscles Positive facet loading bilaterally Positive KEMPS test bilaterally LLE: 5/5 strength RLE: 5/5 strength Sensation intact b/l LE

## 2024-10-05 ENCOUNTER — APPOINTMENT (OUTPATIENT)
Dept: PAIN MANAGEMENT | Facility: CLINIC | Age: 89
End: 2024-10-05
Payer: MEDICARE

## 2024-10-05 ENCOUNTER — APPOINTMENT (OUTPATIENT)
Dept: PAIN MANAGEMENT | Facility: CLINIC | Age: 89
End: 2024-10-05

## 2024-10-05 DIAGNOSIS — M54.16 RADICULOPATHY, LUMBAR REGION: ICD-10-CM

## 2024-10-05 PROCEDURE — 62323 NJX INTERLAMINAR LMBR/SAC: CPT

## 2024-10-05 RX ORDER — TRAMADOL HYDROCHLORIDE 50 MG/1
50 TABLET, COATED ORAL DAILY
Qty: 30 | Refills: 0 | Status: ACTIVE | COMMUNITY
Start: 2024-10-05 | End: 1900-01-01

## 2024-10-06 NOTE — PROCEDURE
[FreeTextEntry3] : Date of Service: 10/05/2024   Account: [id number]   Patient: TEE KENDRICK   YOB: 1935   Age: 89 year   Surgeon:      Ravinder Ventura DO   Assistant:    None   Pre-Operative Diagnosis:         Lumbar radiculopathy M54.16   Post Operative Diagnosis:       Lumbar radiculopathy M54.16   Procedure:             Lumbar interlaminar (L5-S1) epidural steroid injection under fluoroscopic guidance   Anesthesia:            none    This procedure was carried out using fluoroscopic guidance.  The risks and benefits of the procedure were discussed extensively with the patient.  The consent of the patient was obtained, and the following procedure was performed. The patient was placed in the prone position on the fluoroscopy table and the area was prepped and draped in a sterile fashion.  A timeout was performed with all essential staff present and the site and side were verified.   The patient was placed in the prone position with a pillow under the abdomen to minimize the lumbar lordosis.  The lumbar area was prepped and draped in a sterile fashion.  Under A/P view with slight cephalad-caudad angulation, the L5-S1 interspace was identified and marked.  Using sterile technique, the superficial skin was anesthetized with 1% Lidocaine.  A 20-gauge Tuohy needle was advanced into the epidural space under fluoroscopy using loss of resistance at the L5-S1 level.  After negative aspiration for heme or CSF, an epidurogram was obtained in the A/P and lateral fluoroscopic views using 2-3 cc of Omnipaque contrast confirming epidural placement of the needle.  After this, 5 cc of of a mixture of preservative free normal saline and 20mg decadron were injected into the epidural space.   The needle was subsequently removed.  Vital signs remained normal.  Pulse oximeter was used throughout the procedure and the patient's pulse and oxygen saturation remained within normal limits.  The patient tolerated the procedure well.  There were no complications.  The patient was instructed to apply ice over the injection sites for twenty minutes every two hours for the next 24 to 48 hours.   Disposition:        1. The patient was advised to F/U in 1-2 weeks to assess the response to the injection.      2. The patient was also instructed to contact me immediately if there were any concerns related to the procedure performed.

## 2024-10-14 ENCOUNTER — APPOINTMENT (OUTPATIENT)
Dept: PAIN MANAGEMENT | Facility: CLINIC | Age: 89
End: 2024-10-14

## 2024-10-18 ENCOUNTER — APPOINTMENT (OUTPATIENT)
Dept: PAIN MANAGEMENT | Facility: CLINIC | Age: 89
End: 2024-10-18

## 2024-10-22 ENCOUNTER — APPOINTMENT (OUTPATIENT)
Dept: PAIN MANAGEMENT | Facility: CLINIC | Age: 89
End: 2024-10-22
Payer: MEDICARE

## 2024-10-22 VITALS — HEIGHT: 60 IN | WEIGHT: 114 LBS | BODY MASS INDEX: 22.38 KG/M2

## 2024-10-22 DIAGNOSIS — M25.552 PAIN IN LEFT HIP: ICD-10-CM

## 2024-10-22 PROCEDURE — 99214 OFFICE O/P EST MOD 30 MIN: CPT

## 2024-10-22 RX ORDER — HYDROCODONE BITARTRATE AND ACETAMINOPHEN 5; 325 MG/1; MG/1
5-325 TABLET ORAL
Qty: 28 | Refills: 0 | Status: ACTIVE | COMMUNITY
Start: 2024-10-22 | End: 1900-01-01

## 2025-07-17 ENCOUNTER — APPOINTMENT (OUTPATIENT)
Dept: PAIN MANAGEMENT | Facility: CLINIC | Age: 89
End: 2025-07-17
Payer: MEDICARE

## 2025-07-17 PROBLEM — M48.061 LUMBAR FORAMINAL STENOSIS: Status: ACTIVE | Noted: 2025-07-17

## 2025-07-17 PROCEDURE — 99214 OFFICE O/P EST MOD 30 MIN: CPT

## 2025-07-17 RX ORDER — PREGABALIN 25 MG/1
25 CAPSULE ORAL
Refills: 0 | Status: ACTIVE | COMMUNITY

## 2025-09-18 ENCOUNTER — APPOINTMENT (OUTPATIENT)
Dept: NEUROSURGERY | Facility: CLINIC | Age: 89
End: 2025-09-18

## 2025-09-18 VITALS — WEIGHT: 110 LBS | BODY MASS INDEX: 21.6 KG/M2 | HEIGHT: 60 IN

## 2025-09-18 DIAGNOSIS — M47.814 SPONDYLOSIS W/OUT MYELOPATHY OR RADICULOPATHY, THORACIC REGION: ICD-10-CM

## 2025-09-18 DIAGNOSIS — Z86.39 PERSONAL HISTORY OF OTHER ENDOCRINE, NUTRITIONAL AND METABOLIC DISEASE: ICD-10-CM

## 2025-09-18 DIAGNOSIS — M81.8 OTHER OSTEOPOROSIS W/OUT CURRENT PATHOLOGICAL FRACTURE: ICD-10-CM

## 2025-09-18 DIAGNOSIS — F41.9 ANXIETY DISORDER, UNSPECIFIED: ICD-10-CM

## 2025-09-18 DIAGNOSIS — M48.061 SPINAL STENOSIS, LUMBAR REGION WITHOUT NEUROGENIC CLAUDICATION: ICD-10-CM

## 2025-09-18 DIAGNOSIS — Z86.79 PERSONAL HISTORY OF OTHER DISEASES OF THE CIRCULATORY SYSTEM: ICD-10-CM

## 2025-09-18 DIAGNOSIS — F32.A ANXIETY DISORDER, UNSPECIFIED: ICD-10-CM

## 2025-09-18 DIAGNOSIS — Z87.39 PERSONAL HISTORY OF OTHER DISEASES OF THE MUSCULOSKELETAL SYSTEM AND CONNECTIVE TISSUE: ICD-10-CM

## 2025-09-18 RX ORDER — CAL/D3/MAG11/ZINC/COP/MANG/BOR 600 MG-800
TABLET ORAL
Refills: 0 | Status: ACTIVE | COMMUNITY

## 2025-09-18 RX ORDER — AMLODIPINE BESYLATE 2.5 MG/1
2.5 TABLET ORAL
Refills: 0 | Status: ACTIVE | COMMUNITY